# Patient Record
Sex: MALE | Race: WHITE | NOT HISPANIC OR LATINO
[De-identification: names, ages, dates, MRNs, and addresses within clinical notes are randomized per-mention and may not be internally consistent; named-entity substitution may affect disease eponyms.]

---

## 2019-02-08 RX ORDER — TRAZODONE HCL 50 MG
0 TABLET ORAL
Qty: 0 | Refills: 0 | COMMUNITY

## 2019-02-08 NOTE — H&P ADULT - NSHPLABSRESULTS_GEN_ALL_CORE
Preop cbc, bmp, pt/inr, ptt, ua wnl;   T and Screen to be done on DOS  preop cxr wnl per clearance  preop ekg wnl per clearance Preop cbc, bmp, pt/inr, ptt, ua wnl;   preop cxr wnl per clearance  preop ekg wnl per clearance

## 2019-02-08 NOTE — H&P ADULT - PROBLEM SELECTOR PLAN 1
Admit to Orthopaedic Service.  Presents today for elective   Pt medically stable and cleared for procedure today by  Admit to Orthopaedic Service.  Presents today for elective   Pt medically stable and cleared for procedure today by Dr. Li

## 2019-02-08 NOTE — H&P ADULT - NSHPPHYSICALEXAM_GEN_ALL_CORE
PE: Normal head, atraumatic. Normal skin, no rashes/lesions/erythema.        Rest of PE per medical clearance. PE: Normal head, atraumatic. Normal skin, no rashes/lesions/erythema.  MSK: AIN/PIN/U nerves intact to motor BUE. Weakness right interossi compared to left. Wrist flex/ext intact BUE. Sensation intact to M/R/U/Msk/Ax nerves and equal BUE. Cap refill brisk. Skin warm and well perfused. Radial pulses palpable.   Rest of PE per medical clearance. PE: Normal head, atraumatic. Normal skin, no rashes/lesions/erythema.  MSK: AIN/PIN/U nerves intact to motor BUE. Weakness right interossi compared to left. Wrist flex/ext intact BUE. Sensation intact to M/R/U/Msk/Ax nerves and diminished to right medial and radial n distribution compared to left. Cap refill brisk. Skin warm and well perfused. Radial pulses palpable.   Rest of PE per medical clearance.
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
## 2019-02-11 ENCOUNTER — RESULT REVIEW (OUTPATIENT)
Age: 44
End: 2019-02-11

## 2019-02-11 ENCOUNTER — INPATIENT (INPATIENT)
Facility: HOSPITAL | Age: 44
LOS: 0 days | Discharge: HOME | DRG: 472 | End: 2019-02-12
Attending: ORTHOPAEDIC SURGERY | Admitting: ORTHOPAEDIC SURGERY
Payer: COMMERCIAL

## 2019-02-11 VITALS
DIASTOLIC BLOOD PRESSURE: 85 MMHG | WEIGHT: 182.1 LBS | OXYGEN SATURATION: 98 % | HEIGHT: 67 IN | TEMPERATURE: 97 F | SYSTOLIC BLOOD PRESSURE: 122 MMHG | HEART RATE: 60 BPM | RESPIRATION RATE: 16 BRPM

## 2019-02-11 DIAGNOSIS — Z98.890 OTHER SPECIFIED POSTPROCEDURAL STATES: Chronic | ICD-10-CM

## 2019-02-11 DIAGNOSIS — M50.20 OTHER CERVICAL DISC DISPLACEMENT, UNSPECIFIED CERVICAL REGION: ICD-10-CM

## 2019-02-11 DIAGNOSIS — C73 MALIGNANT NEOPLASM OF THYROID GLAND: ICD-10-CM

## 2019-02-11 LAB — GLUCOSE BLDC GLUCOMTR-MCNC: 154 MG/DL — HIGH (ref 70–99)

## 2019-02-11 RX ORDER — OXYCODONE HYDROCHLORIDE 5 MG/1
5 TABLET ORAL EVERY 4 HOURS
Qty: 0 | Refills: 0 | Status: DISCONTINUED | OUTPATIENT
Start: 2019-02-11 | End: 2019-02-11

## 2019-02-11 RX ORDER — DOCUSATE SODIUM 100 MG
100 CAPSULE ORAL THREE TIMES A DAY
Qty: 0 | Refills: 0 | Status: DISCONTINUED | OUTPATIENT
Start: 2019-02-11 | End: 2019-02-12

## 2019-02-11 RX ORDER — LEVOTHYROXINE SODIUM 125 MCG
137 TABLET ORAL DAILY
Qty: 0 | Refills: 0 | Status: DISCONTINUED | OUTPATIENT
Start: 2019-02-11 | End: 2019-02-12

## 2019-02-11 RX ORDER — LEVOTHYROXINE SODIUM 125 MCG
1 TABLET ORAL
Qty: 0 | Refills: 0 | COMMUNITY

## 2019-02-11 RX ORDER — ONDANSETRON 8 MG/1
4 TABLET, FILM COATED ORAL THREE TIMES A DAY
Qty: 0 | Refills: 0 | Status: DISCONTINUED | OUTPATIENT
Start: 2019-02-11 | End: 2019-02-12

## 2019-02-11 RX ORDER — HYDROMORPHONE HYDROCHLORIDE 2 MG/ML
0.5 INJECTION INTRAMUSCULAR; INTRAVENOUS; SUBCUTANEOUS
Qty: 0 | Refills: 0 | Status: DISCONTINUED | OUTPATIENT
Start: 2019-02-11 | End: 2019-02-12

## 2019-02-11 RX ORDER — TRAZODONE HCL 50 MG
0.5 TABLET ORAL
Qty: 0 | Refills: 0 | COMMUNITY

## 2019-02-11 RX ORDER — ACETAMINOPHEN 500 MG
650 TABLET ORAL EVERY 6 HOURS
Qty: 0 | Refills: 0 | Status: DISCONTINUED | OUTPATIENT
Start: 2019-02-11 | End: 2019-02-11

## 2019-02-11 RX ORDER — ACETAMINOPHEN 500 MG
975 TABLET ORAL EVERY 8 HOURS
Qty: 0 | Refills: 0 | Status: DISCONTINUED | OUTPATIENT
Start: 2019-02-11 | End: 2019-02-12

## 2019-02-11 RX ORDER — SODIUM CHLORIDE 9 MG/ML
1000 INJECTION, SOLUTION INTRAVENOUS
Qty: 0 | Refills: 0 | Status: DISCONTINUED | OUTPATIENT
Start: 2019-02-11 | End: 2019-02-12

## 2019-02-11 RX ORDER — CYCLOBENZAPRINE HYDROCHLORIDE 10 MG/1
5 TABLET, FILM COATED ORAL THREE TIMES A DAY
Qty: 0 | Refills: 0 | Status: DISCONTINUED | OUTPATIENT
Start: 2019-02-11 | End: 2019-02-12

## 2019-02-11 RX ORDER — SCOPALAMINE 1 MG/3D
1 PATCH, EXTENDED RELEASE TRANSDERMAL ONCE
Qty: 0 | Refills: 0 | Status: COMPLETED | OUTPATIENT
Start: 2019-02-11 | End: 2019-02-12

## 2019-02-11 RX ORDER — SENNA PLUS 8.6 MG/1
2 TABLET ORAL AT BEDTIME
Qty: 0 | Refills: 0 | Status: DISCONTINUED | OUTPATIENT
Start: 2019-02-11 | End: 2019-02-12

## 2019-02-11 RX ORDER — OXYCODONE HYDROCHLORIDE 5 MG/1
10 TABLET ORAL EVERY 4 HOURS
Qty: 0 | Refills: 0 | Status: DISCONTINUED | OUTPATIENT
Start: 2019-02-11 | End: 2019-02-12

## 2019-02-11 RX ORDER — ATORVASTATIN CALCIUM 80 MG/1
40 TABLET, FILM COATED ORAL AT BEDTIME
Qty: 0 | Refills: 0 | Status: DISCONTINUED | OUTPATIENT
Start: 2019-02-11 | End: 2019-02-12

## 2019-02-11 RX ORDER — OXYCODONE HYDROCHLORIDE 5 MG/1
10 TABLET ORAL EVERY 4 HOURS
Qty: 0 | Refills: 0 | Status: DISCONTINUED | OUTPATIENT
Start: 2019-02-11 | End: 2019-02-11

## 2019-02-11 RX ORDER — HYDROMORPHONE HYDROCHLORIDE 2 MG/ML
0.5 INJECTION INTRAMUSCULAR; INTRAVENOUS; SUBCUTANEOUS EVERY 4 HOURS
Qty: 0 | Refills: 0 | Status: DISCONTINUED | OUTPATIENT
Start: 2019-02-11 | End: 2019-02-12

## 2019-02-11 RX ORDER — ROSUVASTATIN CALCIUM 5 MG/1
1 TABLET ORAL
Qty: 0 | Refills: 0 | COMMUNITY

## 2019-02-11 RX ORDER — TRAZODONE HCL 50 MG
50 TABLET ORAL DAILY
Qty: 0 | Refills: 0 | Status: DISCONTINUED | OUTPATIENT
Start: 2019-02-11 | End: 2019-02-12

## 2019-02-11 RX ORDER — OXYCODONE HYDROCHLORIDE 5 MG/1
5 TABLET ORAL EVERY 4 HOURS
Qty: 0 | Refills: 0 | Status: DISCONTINUED | OUTPATIENT
Start: 2019-02-11 | End: 2019-02-12

## 2019-02-11 RX ORDER — CEFAZOLIN SODIUM 1 G
2000 VIAL (EA) INJECTION EVERY 8 HOURS
Qty: 0 | Refills: 0 | Status: COMPLETED | OUTPATIENT
Start: 2019-02-11 | End: 2019-02-12

## 2019-02-11 RX ORDER — CEFAZOLIN SODIUM 1 G
2000 VIAL (EA) INJECTION EVERY 8 HOURS
Qty: 0 | Refills: 0 | Status: DISCONTINUED | OUTPATIENT
Start: 2019-02-11 | End: 2019-02-11

## 2019-02-11 RX ADMIN — SODIUM CHLORIDE 100 MILLILITER(S): 9 INJECTION, SOLUTION INTRAVENOUS at 13:40

## 2019-02-11 RX ADMIN — HYDROMORPHONE HYDROCHLORIDE 0.5 MILLIGRAM(S): 2 INJECTION INTRAMUSCULAR; INTRAVENOUS; SUBCUTANEOUS at 15:32

## 2019-02-11 RX ADMIN — Medication 2000 MILLIGRAM(S): at 17:20

## 2019-02-11 RX ADMIN — OXYCODONE HYDROCHLORIDE 10 MILLIGRAM(S): 5 TABLET ORAL at 23:21

## 2019-02-11 RX ADMIN — Medication 975 MILLIGRAM(S): at 19:29

## 2019-02-11 RX ADMIN — SENNA PLUS 2 TABLET(S): 8.6 TABLET ORAL at 22:21

## 2019-02-11 RX ADMIN — HYDROMORPHONE HYDROCHLORIDE 0.5 MILLIGRAM(S): 2 INJECTION INTRAMUSCULAR; INTRAVENOUS; SUBCUTANEOUS at 15:25

## 2019-02-11 RX ADMIN — OXYCODONE HYDROCHLORIDE 10 MILLIGRAM(S): 5 TABLET ORAL at 22:21

## 2019-02-11 RX ADMIN — ATORVASTATIN CALCIUM 40 MILLIGRAM(S): 80 TABLET, FILM COATED ORAL at 22:21

## 2019-02-11 RX ADMIN — Medication 100 MILLIGRAM(S): at 22:21

## 2019-02-11 RX ADMIN — HYDROMORPHONE HYDROCHLORIDE 0.5 MILLIGRAM(S): 2 INJECTION INTRAMUSCULAR; INTRAVENOUS; SUBCUTANEOUS at 15:11

## 2019-02-11 RX ADMIN — Medication 975 MILLIGRAM(S): at 19:30

## 2019-02-11 NOTE — PROGRESS NOTE ADULT - ASSESSMENT
Spoke with patient  preoperatively and they aware I will be assisting during today's surgery and disclosure performed.

## 2019-02-11 NOTE — PROGRESS NOTE ADULT - SUBJECTIVE AND OBJECTIVE BOX
Orthopaedics Post Op Check    Procedure: ACDF C6-T1  Surgeon: Dr. Thorne     Pt comfortable, without complaints  Denies CP, SOB, N/V, numbness/tingling     Vital Signs Last 24 Hrs  T(C): 36.7 (11 Feb 2019 14:15), Max: 37 (11 Feb 2019 13:25)  T(F): 98.1 (11 Feb 2019 14:15), Max: 98.6 (11 Feb 2019 13:25)  HR: 88 (11 Feb 2019 14:15) (60 - 94)  BP: 152/84 (11 Feb 2019 14:15) (122/85 - 165/76)  BP(mean): 110 (11 Feb 2019 13:45) (97 - 110)  RR: 15 (11 Feb 2019 14:15) (15 - 17)  SpO2: 96% (11 Feb 2019 14:15) (94% - 98%)  AVSS, NAD    Dressing C/D/I; collar in place; HV x 1   General: Pt Alert and oriented       Sensation intact to bilateral UE distally. Motor Strength 5/5 to /interossei/triceps/biceps/deltoid bilaterally. AIN/PIN intact.         Post op XR: Fluoroscopy utilized intra op to confirm level     A/P: 43yMale POD#0 s/p ACDF C6-T1  - Stable  - Pain Control  - DVT ppx: SCDs  - Post op abx: Ancef  - PT, WBS: WBAT  - F/U AM Labs

## 2019-02-11 NOTE — PROGRESS NOTE ADULT - SUBJECTIVE AND OBJECTIVE BOX
Pain Management Consult Note - [facility] [phone]    Chief Complaint: Neck Pain    HPI: Patient seen and examined today, patient s/p ACDF C6-C7, C7-T1, post op day 0. Patient complains of neck and surgical site pain, pain addressed with Oxycodone Po, patient complains of soreness with swalling. . Reviewed pain medication regimen with patient. Dressing c,d,i.         Pain is ___ sharp _x___dull ___burning _x__achy ___ Intensity: ____ mild _x__mod _x__severe     Location __x__surgical site _x___cervical _____lumbar ____abd ____upper ext____lower ext    Worse with _x___activity __x__movement _____physical therapy___ Rest    Improved with _x___medication __x_rest ____physical therapy      ROS: Const:  _-__febrile   Eyes:___ENT:___CV: _-__chest pain  Resp: __-__sob  GI:_-__nausea _-__vomiting ___abd pain ___npo ___clears __full diet __bm  :___ Musk: _x__pain _x__spasm  Skin:___ Neuro:  __-_lvcokfbx__-_bdzpljvna_-__ numbness _-__weakness _-__paresth  Psych:-__anxiety  Endo:___ Heme:___Allergy:_________, _x__all others reviewed and negative      PAST MEDICAL & SURGICAL HISTORY:  Thyroid cancer  History of thyroidectomy  History of lithotripsy  Thyroid cancer  Thyroid cancer  HNP (herniated nucleus pulposus), cervical  History of thyroidectomy  History of lithotripsy    SH: _-__Tobacco   -___Alcohol                          FH:FAMILY HISTORY:      acetaminophen   Tablet .. 975 milliGRAM(s) Oral every 8 hours  acetaminophen   Tablet .. 650 milliGRAM(s) Oral every 6 hours PRN  atorvastatin 40 milliGRAM(s) Oral at bedtime  ceFAZolin  Injectable. 2000 milliGRAM(s) IV Push every 8 hours  cyclobenzaprine 5 milliGRAM(s) Oral three times a day PRN  docusate sodium 100 milliGRAM(s) Oral three times a day  HYDROmorphone  Injectable 0.5 milliGRAM(s) IV Push every 4 hours PRN  HYDROmorphone  Injectable 0.5 milliGRAM(s) IV Push every 15 minutes PRN  HYDROmorphone  Injectable 0.5 milliGRAM(s) IV Push every 2 hours PRN  lactated ringers. 1000 milliLiter(s) IV Continuous <Continuous>  levothyroxine 137 MICROGram(s) Oral daily  oxyCODONE    IR 5 milliGRAM(s) Oral every 4 hours PRN  oxyCODONE    IR 10 milliGRAM(s) Oral every 4 hours PRN  oxyCODONE    IR 5 milliGRAM(s) Oral every 4 hours PRN  oxyCODONE    IR 10 milliGRAM(s) Oral every 4 hours PRN  senna 2 Tablet(s) Oral at bedtime  traZODone 50 milliGRAM(s) Oral daily      T(C): 36.7 (02-11-19 @ 14:15), Max: 37 (02-11-19 @ 13:25)  HR: 80 (02-11-19 @ 15:15) (60 - 94)  BP: 150/78 (02-11-19 @ 15:15) (122/85 - 165/76)  RR: 19 (02-11-19 @ 15:15) (15 - 19)  SpO2: 97% (02-11-19 @ 15:15) (94% - 98%)  Wt(kg): --    T(C): 36.7 (02-11-19 @ 14:15), Max: 37 (02-11-19 @ 13:25)  HR: 80 (02-11-19 @ 15:15) (60 - 94)  BP: 150/78 (02-11-19 @ 15:15) (122/85 - 165/76)  RR: 19 (02-11-19 @ 15:15) (15 - 19)  SpO2: 97% (02-11-19 @ 15:15) (94% - 98%)  Wt(kg): --    T(C): 36.7 (02-11-19 @ 14:15), Max: 37 (02-11-19 @ 13:25)  HR: 80 (02-11-19 @ 15:15) (60 - 94)  BP: 150/78 (02-11-19 @ 15:15) (122/85 - 165/76)  RR: 19 (02-11-19 @ 15:15) (15 - 19)  SpO2: 97% (02-11-19 @ 15:15) (94% - 98%)  Wt(kg): --      PHYSICAL EXAM:  Gen Appearance: _x__no acute distress _x__appropriate        Neuro: _x__SILT feet____ EOM Intact Psych: AAOX_3_, _x__mood/affect appropriate        Eyes: _x__conjunctiva WNL  _x____ Pupils equal and round        ENT: _x__ears and nose atraumatic__x_ Hearing grossly intact        Neck: x___trachea midline, no visible masses ___thyroid without palpable mass    Resp: _x__Nml WOB____No tactile fremitus ___clear to auscultation    Cardio: _x__extremities free from edema __x__pedal pulses palpable    GI/Abdomen: _x__soft __x___ Nontender____x__Nondistended_____HSM    Lymphatic: __x_no palpable nodes in neck  x___no palpable nodes calves and feet    Skin/Wound: ___Incision, _x__Dressing c/d/i,   ____surrounding tissues soft,  _x__drain/chest tube present____    Muscular: EHL __5_/5  Gastrocnemius5___/5    ___absent clubbing/cyanosis        ASSESSMENT: This is a 43y old Male with a history of cervical hnp, s/p ACDF C6-C7, C7-T1, post op day 0.       Recommended Treatment PLAN:  1. Oxycodone 5-10mg Po Q4h prn moderate to severe pain  2. Dilaudid 0.5mg Q2h IVP prn breakthrough pain   3. Tylenol 975mg PO Q8h standing  4. Flexeril 5mg Po Q8h prn muscle spasms  PLan discussed with Dr. Glenn Ocampo Pain Management Consult Note - [facility] [phone]    Chief Complaint: Neck Pain    HPI: Patient seen and examined today, patient s/p ACDF C6-C7, C7-T1, post op day 0. Patient complains of neck and surgical site pain, pain addressed with Oxycodone Po, patient complains of soreness with swallowing. . Reviewed pain medication regimen with patient. Dressing c,d,i.         Pain is ___ sharp _x___dull ___burning _x__achy ___ Intensity: ____ mild _x__mod _x__severe     Location __x__surgical site _x___cervical _____lumbar ____abd ____upper ext____lower ext    Worse with _x___activity __x__movement _____physical therapy___ Rest    Improved with _x___medication __x_rest ____physical therapy      ROS: Const:  _-__febrile   Eyes:___ENT:___CV: _-__chest pain  Resp: __-__sob  GI:_-__nausea _-__vomiting ___abd pain ___npo ___clears __full diet __bm  :___ Musk: _x__pain _x__spasm  Skin:___ Neuro:  __-_tvlawfjy__-_axgliagnc_-__ numbness _-__weakness _-__paresth  Psych:-__anxiety  Endo:___ Heme:___Allergy:_________, _x__all others reviewed and negative      PAST MEDICAL & SURGICAL HISTORY:  Thyroid cancer  History of thyroidectomy  History of lithotripsy  Thyroid cancer  Thyroid cancer  HNP (herniated nucleus pulposus), cervical  History of thyroidectomy  History of lithotripsy    SH: _-__Tobacco   -___Alcohol                          FH:FAMILY HISTORY:      acetaminophen   Tablet .. 975 milliGRAM(s) Oral every 8 hours  acetaminophen   Tablet .. 650 milliGRAM(s) Oral every 6 hours PRN  atorvastatin 40 milliGRAM(s) Oral at bedtime  ceFAZolin  Injectable. 2000 milliGRAM(s) IV Push every 8 hours  cyclobenzaprine 5 milliGRAM(s) Oral three times a day PRN  docusate sodium 100 milliGRAM(s) Oral three times a day  HYDROmorphone  Injectable 0.5 milliGRAM(s) IV Push every 4 hours PRN  HYDROmorphone  Injectable 0.5 milliGRAM(s) IV Push every 15 minutes PRN  HYDROmorphone  Injectable 0.5 milliGRAM(s) IV Push every 2 hours PRN  lactated ringers. 1000 milliLiter(s) IV Continuous <Continuous>  levothyroxine 137 MICROGram(s) Oral daily  oxyCODONE    IR 5 milliGRAM(s) Oral every 4 hours PRN  oxyCODONE    IR 10 milliGRAM(s) Oral every 4 hours PRN  oxyCODONE    IR 5 milliGRAM(s) Oral every 4 hours PRN  oxyCODONE    IR 10 milliGRAM(s) Oral every 4 hours PRN  senna 2 Tablet(s) Oral at bedtime  traZODone 50 milliGRAM(s) Oral daily      T(C): 36.7 (02-11-19 @ 14:15), Max: 37 (02-11-19 @ 13:25)  HR: 80 (02-11-19 @ 15:15) (60 - 94)  BP: 150/78 (02-11-19 @ 15:15) (122/85 - 165/76)  RR: 19 (02-11-19 @ 15:15) (15 - 19)  SpO2: 97% (02-11-19 @ 15:15) (94% - 98%)  Wt(kg): --    T(C): 36.7 (02-11-19 @ 14:15), Max: 37 (02-11-19 @ 13:25)  HR: 80 (02-11-19 @ 15:15) (60 - 94)  BP: 150/78 (02-11-19 @ 15:15) (122/85 - 165/76)  RR: 19 (02-11-19 @ 15:15) (15 - 19)  SpO2: 97% (02-11-19 @ 15:15) (94% - 98%)  Wt(kg): --    T(C): 36.7 (02-11-19 @ 14:15), Max: 37 (02-11-19 @ 13:25)  HR: 80 (02-11-19 @ 15:15) (60 - 94)  BP: 150/78 (02-11-19 @ 15:15) (122/85 - 165/76)  RR: 19 (02-11-19 @ 15:15) (15 - 19)  SpO2: 97% (02-11-19 @ 15:15) (94% - 98%)  Wt(kg): --      PHYSICAL EXAM:  Gen Appearance: _x__no acute distress _x__appropriate        Neuro: _x__SILT feet____ EOM Intact Psych: AAOX_3_, _x__mood/affect appropriate        Eyes: _x__conjunctiva WNL  _x____ Pupils equal and round        ENT: _x__ears and nose atraumatic__x_ Hearing grossly intact        Neck: x___trachea midline, no visible masses ___thyroid without palpable mass    Resp: _x__Nml WOB____No tactile fremitus ___clear to auscultation    Cardio: _x__extremities free from edema __x__pedal pulses palpable    GI/Abdomen: _x__soft __x___ Nontender____x__Nondistended_____HSM    Lymphatic: __x_no palpable nodes in neck  x___no palpable nodes calves and feet    Skin/Wound: ___Incision, _x__Dressing c/d/i,   ____surrounding tissues soft,  _x__drain/chest tube present____    Muscular: EHL __5_/5  Gastrocnemius5___/5    ___absent clubbing/cyanosis        ASSESSMENT: This is a 43y old Male with a history of cervical hnp, s/p ACDF C6-C7, C7-T1, post op day 0.       Recommended Treatment PLAN:  1. Oxycodone 5-10mg Po Q4h prn moderate to severe pain  2. Dilaudid 0.5mg Q2h IVP prn breakthrough pain   3. Tylenol 975mg PO Q8h standing  4. Flexeril 5mg Po Q8h prn muscle spasms  PLan discussed with Dr. Glenn Ocampo

## 2019-02-11 NOTE — PROGRESS NOTE ADULT - SUBJECTIVE AND OBJECTIVE BOX
Patient seen, examined in preop area with wife, and family members  at chairside. He continues to describe severe, unremitting pain radiating into the right upper extremity. The only positiion of partial relief of symptoms is with the arm resting over the head (shoulder abducted).  Exam is changed with regard to right upper extremity weakness: now the elbow extension is 2/5, with wrist flexion 2/5 and finger extension 2/5 and interossei 2/5.    reflexes and sensory examination is unchanged.    The condition and treatment options were discussed with the patient and his family.  The risks, benefits and alternatives to anterior cervical discectomy at C6-7 and C7-T1 with anterior plate, screws and donated bone graft was discussed. The primary goal of surgery  is to relieve radicular pain and prevent further neurological deterioration.  Full neurological recovery cannot be guaranteed.   The complications of surgery were discussed and include, but are not limited to, wound problems, infection, bleeding, vascular injury, nerve injury, paralysis, cerebrospinal fluid leak, persistent symptoms and/or weakness, hardware failure, loss of fixation, non-union, pseudarthrosis, junctional/adjacent level disease, difficulty swallowing, hoarse voice, vocal cord paralysis, esophageal injury, deep vein thrombosis, pulmonary embolus, myocardial infarction, stroke, death and need for additional surgery.    All questions were answered and informed consent was obtained.  Notes and/or documentation by others in this electronic record are signed but not reviewed for accuracy.  NLT

## 2019-02-12 ENCOUNTER — TRANSCRIPTION ENCOUNTER (OUTPATIENT)
Age: 44
End: 2019-02-12

## 2019-02-12 VITALS
HEART RATE: 80 BPM | SYSTOLIC BLOOD PRESSURE: 122 MMHG | DIASTOLIC BLOOD PRESSURE: 66 MMHG | TEMPERATURE: 99 F | RESPIRATION RATE: 18 BRPM | OXYGEN SATURATION: 96 %

## 2019-02-12 LAB
ANION GAP SERPL CALC-SCNC: 9 MMOL/L — SIGNIFICANT CHANGE UP (ref 5–17)
BASOPHILS # BLD AUTO: 0 K/UL — SIGNIFICANT CHANGE UP (ref 0–0.2)
BASOPHILS NFR BLD AUTO: 0 % — SIGNIFICANT CHANGE UP (ref 0–2)
BUN SERPL-MCNC: 10 MG/DL — SIGNIFICANT CHANGE UP (ref 7–23)
CALCIUM SERPL-MCNC: 9.7 MG/DL — SIGNIFICANT CHANGE UP (ref 8.4–10.5)
CHLORIDE SERPL-SCNC: 104 MMOL/L — SIGNIFICANT CHANGE UP (ref 96–108)
CO2 SERPL-SCNC: 29 MMOL/L — SIGNIFICANT CHANGE UP (ref 22–31)
CREAT SERPL-MCNC: 1.08 MG/DL — SIGNIFICANT CHANGE UP (ref 0.5–1.3)
EOSINOPHIL # BLD AUTO: 0 K/UL — SIGNIFICANT CHANGE UP (ref 0–0.5)
EOSINOPHIL NFR BLD AUTO: 0 % — SIGNIFICANT CHANGE UP (ref 0–6)
GLUCOSE SERPL-MCNC: 134 MG/DL — HIGH (ref 70–99)
HCT VFR BLD CALC: 47.4 % — SIGNIFICANT CHANGE UP (ref 39–50)
HGB BLD-MCNC: 16 G/DL — SIGNIFICANT CHANGE UP (ref 13–17)
LYMPHOCYTES # BLD AUTO: 1.93 K/UL — SIGNIFICANT CHANGE UP (ref 1–3.3)
LYMPHOCYTES # BLD AUTO: 8.7 % — LOW (ref 13–44)
MCHC RBC-ENTMCNC: 30.1 PG — SIGNIFICANT CHANGE UP (ref 27–34)
MCHC RBC-ENTMCNC: 33.8 GM/DL — SIGNIFICANT CHANGE UP (ref 32–36)
MCV RBC AUTO: 89.3 FL — SIGNIFICANT CHANGE UP (ref 80–100)
MONOCYTES # BLD AUTO: 0.58 K/UL — SIGNIFICANT CHANGE UP (ref 0–0.9)
MONOCYTES NFR BLD AUTO: 2.6 % — SIGNIFICANT CHANGE UP (ref 2–14)
NEUTROPHILS # BLD AUTO: 19.28 K/UL — HIGH (ref 1.8–7.4)
NEUTROPHILS NFR BLD AUTO: 85.2 % — HIGH (ref 43–77)
PLATELET # BLD AUTO: 294 K/UL — SIGNIFICANT CHANGE UP (ref 150–400)
POTASSIUM SERPL-MCNC: 4.2 MMOL/L — SIGNIFICANT CHANGE UP (ref 3.5–5.3)
POTASSIUM SERPL-SCNC: 4.2 MMOL/L — SIGNIFICANT CHANGE UP (ref 3.5–5.3)
RBC # BLD: 5.31 M/UL — SIGNIFICANT CHANGE UP (ref 4.2–5.8)
RBC # FLD: 12.5 % — SIGNIFICANT CHANGE UP (ref 10.3–14.5)
SODIUM SERPL-SCNC: 142 MMOL/L — SIGNIFICANT CHANGE UP (ref 135–145)
WBC # BLD: 22.16 K/UL — HIGH (ref 3.8–10.5)
WBC # FLD AUTO: 22.16 K/UL — HIGH (ref 3.8–10.5)

## 2019-02-12 RX ORDER — DIAZEPAM 5 MG
0 TABLET ORAL
Qty: 0 | Refills: 0 | COMMUNITY

## 2019-02-12 RX ORDER — SENNA PLUS 8.6 MG/1
2 TABLET ORAL
Qty: 0 | Refills: 0 | COMMUNITY
Start: 2019-02-12

## 2019-02-12 RX ORDER — BENZOCAINE AND MENTHOL 5; 1 G/100ML; G/100ML
1 LIQUID ORAL
Qty: 0 | Refills: 0 | COMMUNITY
Start: 2019-02-12

## 2019-02-12 RX ORDER — BENZOCAINE AND MENTHOL 5; 1 G/100ML; G/100ML
1 LIQUID ORAL ONCE
Qty: 0 | Refills: 0 | Status: COMPLETED | OUTPATIENT
Start: 2019-02-12 | End: 2019-02-12

## 2019-02-12 RX ORDER — ACETAMINOPHEN 500 MG
2 TABLET ORAL
Qty: 0 | Refills: 0 | COMMUNITY
Start: 2019-02-12

## 2019-02-12 RX ORDER — DOCUSATE SODIUM 100 MG
1 CAPSULE ORAL
Qty: 0 | Refills: 0 | COMMUNITY
Start: 2019-02-12

## 2019-02-12 RX ORDER — DIAZEPAM 5 MG
1 TABLET ORAL
Qty: 0 | Refills: 0 | COMMUNITY

## 2019-02-12 RX ORDER — POLYETHYLENE GLYCOL 3350 17 G/17G
1 POWDER, FOR SOLUTION ORAL
Qty: 0 | Refills: 0 | COMMUNITY

## 2019-02-12 RX ADMIN — BENZOCAINE AND MENTHOL 1 LOZENGE: 5; 1 LIQUID ORAL at 12:06

## 2019-02-12 RX ADMIN — Medication 975 MILLIGRAM(S): at 07:21

## 2019-02-12 RX ADMIN — Medication 100 MILLIGRAM(S): at 07:20

## 2019-02-12 RX ADMIN — SODIUM CHLORIDE 100 MILLILITER(S): 9 INJECTION, SOLUTION INTRAVENOUS at 01:11

## 2019-02-12 RX ADMIN — Medication 137 MICROGRAM(S): at 06:57

## 2019-02-12 RX ADMIN — Medication 975 MILLIGRAM(S): at 08:21

## 2019-02-12 RX ADMIN — Medication 2000 MILLIGRAM(S): at 01:11

## 2019-02-12 RX ADMIN — SCOPALAMINE 1 PATCH: 1 PATCH, EXTENDED RELEASE TRANSDERMAL at 07:18

## 2019-02-12 RX ADMIN — SCOPALAMINE 1 PATCH: 1 PATCH, EXTENDED RELEASE TRANSDERMAL at 01:11

## 2019-02-12 NOTE — DISCHARGE NOTE ADULT - OTHER SIGNIFICANT FINDINGS
***Notes/documentation by others:  Despite the "electronic signature" provided in this electronic chart, the contents of this note and notes by others in this chart have not been reviewed for accuracy.  Hospital policy requires me to provide an electronic signature but it is not customary practice for me to review the notes of others and as a result, I cannot attest to the accuracy of this note.  NLT
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
## 2019-02-12 NOTE — PROGRESS NOTE ADULT - SUBJECTIVE AND OBJECTIVE BOX
patient seen, examined.  Did well overnight; wife at bedside overnight; reports near complete resolution of right radicular symptoms.  Mild finger tip numbness/discomfort residual noted.  Strength signficantanly improved.  No longer needs to hold arm overhead in shoulder abduction. Swallowing without difficulty, has been out of bed, ambulating, voiding without difficulty.   Vocalizing well.  Tolerating fluids/diet well.    PE:   dressing dry and intact; drain in place- min drainage  motor 4/5 (improved) interossei and elbow extension  sensory intact to light touch  deep tendon reflexes unchanged.  no calf tenderness (sequential compression devices are on and in place)    imp:  pod #1   pain management  remove drain later this AM  sequential compression devices while in bed  incentive spirometry (I personally reviewed instructions and technique with patient)  progressive ambulation  avoid lifting, twisting, bending, exposure to cigarette smoke, Non-steroidal anti-inflammatories  discussed vitamin D, calcium (if permitted with kidney stone per primary care), metamucil/colace  cleared to discharge to home later today   encourage hydration  will start OT at home later this week  patient advised of softer bone than expected for his age, noted by me at surgery, and will followup for bone metabolic workup with primary care carefully with history of kidney stones)  call for questions, fevers, chills or any wound drainage  go to nearest emergency room for any new symptoms, difficulty swallowing or breathing  follow-up in 7-10 days

## 2019-02-12 NOTE — DISCHARGE NOTE ADULT - MEDICATION SUMMARY - MEDICATIONS TO TAKE
I will START or STAY ON the medications listed below when I get home from the hospital:    acetaminophen 325 mg oral tablet  -- 2 tab(s) by mouth every 6 hours, As Needed for mild pain (1-3) or fever (temperature greater than 100.4F), max 3000mg daily  -- Indication: For Mild pain or fever - please purchase over the counter    Percocet 5/325 oral tablet  -- 1 tab po q6h prn moderate pain (4-6) or 2 tabs po q6h prn severe pain (7-10), wean off as kimberlee,  MDD:5 tabs. Max APAP 3000mg daily.  -- Indication: For Moderate to severe pain    Valium 5 mg oral tablet  -- 1  by mouth once a day (at bedtime), As Needed  -- Indication: For Muscle tightness/spasm    traZODone 100 mg oral tablet  -- 0.5  orally  -- Indication: For Insomnia    Crestor 10 mg oral tablet  -- 1 tab(s) by mouth once a day (at bedtime)  -- Indication: For HL    docusate sodium 100 mg oral capsule  -- 1 cap(s) by mouth 3 times a day, As Needed for constipation  -- Indication: For Constipation - please purchase over the counter    senna oral tablet  -- 2 tab(s) by mouth once a day (at bedtime), As Needed for constipation  -- Indication: For Constipation - please purchase over the counter    MiraLax oral powder for reconstitution  -- 1 packet(s) by mouth once a day, As Needed for constipation  -- Indication: For Constipation - please purchase over the counter    Cepacol Anesthetic 10 mg-0.07% oral lozenge  -- 1 lozenge by mouth every 4 hours, As Needed for sore throat  -- Indication: For Sore throat -  please purchase over the counter    Levothroid 137 mcg (0.137 mg) oral tablet  -- 1 tab(s) by mouth once a day  -- Indication: For Thyroid hormone I will START or STAY ON the medications listed below when I get home from the hospital:    acetaminophen 325 mg oral tablet  -- 2 tab(s) by mouth every 6 hours, As Needed for mild pain (1-3) or fever (temperature greater than 100.4F), max 3000mg daily  -- Indication: For Mild pain or fever - please purchase over the counter    Percocet 5/325 oral tablet  -- 1 tab po q6h prn moderate pain (4-6) or 2 tabs po q6h prn severe pain (7-10), wean off as kimberlee,  MDD:5 tabs. Max APAP 3000mg daily.  -- Indication: For Moderate to severe pain    Valium 5 mg oral tablet  -- 1  by mouth once a day (at bedtime), As Needed  -- Indication: For Muscle tightness/spasm    traZODone 100 mg oral tablet  -- 0.5  orally  -- Indication: For Insomnia    Crestor 10 mg oral tablet  -- 1 tab(s) by mouth once a day (at bedtime)  -- Indication: For HL    docusate sodium 100 mg oral capsule  -- 1 cap(s) by mouth 3 times a day, As Needed for constipation  -- Indication: For Constipation - please purchase over the counter    senna oral tablet  -- 2 tab(s) by mouth once a day (at bedtime), As Needed for constipation  -- Indication: For Constipation - please purchase over the counter    MiraLax oral powder for reconstitution  -- 1 packet(s) by mouth once a day, As Needed for constipation  -- Indication: For Constipation - please purchase over the counter    benzocaine-menthol 15 mg-3.6 mg mucous membrane lozenge  -- 1 lozenge mucous membrane every 4 hours, As Needed for sore throat  -- Indication: For Sore throat    Levothroid 137 mcg (0.137 mg) oral tablet  -- 1 tab(s) by mouth once a day  -- Indication: For Thyroid hormone

## 2019-02-12 NOTE — OCCUPATIONAL THERAPY INITIAL EVALUATION ADULT - GENERAL OBSERVATIONS, REHAB EVAL
Right hand dominant. Patient cleared for Occupational Therapy by RODRIGUEZ Marquis. Patient received in NAD, +IV heplock, + RN Kandis presents, +wife, +wound anterior cervical spine c/d/i.

## 2019-02-12 NOTE — OCCUPATIONAL THERAPY INITIAL EVALUATION ADULT - GROSSLY INTACT, SENSORY
Grossly Intact/Left UE/Right LE/Right UE/Left LE/patient states that sensation/weakness in right UE resolved post-surgery

## 2019-02-12 NOTE — PROGRESS NOTE ADULT - PROVIDER SPECIALTY LIST ADULT
Neurosurgery
Orthopedics
Pain Medicine
Pain Medicine
I will START or STAY ON the medications listed below when I get home from the hospital:    spironolactone 25 mg oral tablet  -- 1 tab(s) by mouth once a day  -- Indication: For Cardiac med    Percocet 5/325 oral tablet  -- 1 tab(s) by mouth every 6 hours, As Needed -for mild pain  May take 2 Tabs every 6 hours for severe pain MDD:6  -- Caution federal law prohibits the transfer of this drug to any person other  than the person for whom it was prescribed.  May cause drowsiness.  Alcohol may intensify this effect.  Use care when operating dangerous machinery.  This prescription cannot be refilled.  This product contains acetaminophen.  Do not use  with any other product containing acetaminophen to prevent possible liver damage.  Using more of this medication than prescribed may cause serious breathing problems.    -- Indication: For pain    Tylenol 325 mg oral tablet  -- 2 tab(s) by mouth every 6 hours, As Needed for mild pain  -- This product contains acetaminophen.  Do not use  with any other product containing acetaminophen to prevent possible liver damage.    -- Indication: For pain    aspirin 81 mg oral delayed release tablet  -- 1 tab(s) by mouth once a day  -- Indication: For Cardiac med    colchicine 0.6 mg oral tablet  -- 1 tab(s) by mouth 2 times a day  -- Indication: For Cardiac med    atorvastatin 40 mg oral tablet  -- 1 tab(s) by mouth once a day (at bedtime)  -- Indication: For Anticholesterol    metoprolol tartrate 50 mg oral tablet  -- 1 tab(s) by mouth 2 times a day  -- Indication: For Cardiac med    furosemide 40 mg oral tablet  -- 1 tab(s) by mouth once a day  -- Indication: For Cardiac med    famotidine 20 mg oral tablet  -- 1 tab(s) by mouth 2 times a day  -- Indication: For GERD    docusate sodium 100 mg oral capsule  -- 1 cap(s) by mouth 3 times a day  -- Indication: For Stool softener    polyethylene glycol 3350 oral powder for reconstitution  -- 17 gram(s) by mouth once a day, As Needed -for constipation   -- Indication: For laxative    glucosamine hydrochloride 1500 mg oral tablet  -- 1 tab(s) by mouth once a day  -- Indication: For Supplement    Vitamin D3 5000 intl units oral capsule  -- 1 cap(s) by mouth once a day  -- Indication: For Supplement/vitamin

## 2019-02-12 NOTE — DISCHARGE NOTE ADULT - CARE PROVIDER_API CALL
Kehinde Thorne)  Orthopaedic Surgery  [address], [address] [place] [address]  Phone: [phone]  Fax: [phone]  Follow Up Time:

## 2019-02-12 NOTE — DISCHARGE NOTE ADULT - PATIENT PORTAL LINK FT
You can access the [facility], offered by [facility], by registering with the following website: [email or web address]

## 2019-02-12 NOTE — PROGRESS NOTE ADULT - SUBJECTIVE AND OBJECTIVE BOX
Pain Management Progress Note - [facility] [phone]      HPI: Patient seen and examined today, patient s/p ACDF C6-C7, C7-T1, post op day 1. Patient complains of neck and surgical site pain, pain addressed with Oxycodone Po. Patient AxOx3, denies n,v,, no s/s of oversedation.      Pain is ___ sharp _x___dull ___burning _x__achy ___ Intensity: ____ mild _x__mod _x__severe     Location __x__surgical site _x___cervical _____lumbar ____abd ____upper ext____lower ext    Worse with _x___activity __x__movement _____physical therapy___ Rest    Improved with _x___medication __x_rest ____physical therapy      levothyroxine  atorvastatin  traZODone  lactated ringers.  acetaminophen   Tablet ..  ceFAZolin   IVPB  docusate sodium  senna  oxyCODONE    IR  HYDROmorphone  Injectable  ceFAZolin  Injectable.  oxyCODONE    IR  HYDROmorphone  Injectable  acetaminophen   Tablet ..  cyclobenzaprine  ondansetron    Tablet  scopolamine   Patch  benzocaine 15 mG/menthol 3.6 mG Lozenge      ROS: Const:  _-__febrile   Eyes:___ENT:___CV: _-__chest pain  Resp: __-__sob  GI:_-__nausea _-__vomiting ___abd pain ___npo ___clears _x_full diet __bm  :___ Musk: _x__pain _x__spasm  Skin:___ Neuro:  __-_htklxyvu__-_wlovprkpb_-__ numbness _-__weakness _-__paresth  Psych:-__anxiety  Endo:___ Heme:___Allergy:_________, _x__all others reviewed and negative      PAST MEDICAL & SURGICAL HISTORY:  Thyroid cancer  History of thyroidectomy  History of lithotripsy  Thyroid cancer  Thyroid cancer  HNP (herniated nucleus pulposus), cervical  History of thyroidectomy  History of lithotripsy    02-12 @ 06:4684 mL/min/1.73M2      Hemoglobin: 16.0 g/dL (02-12 @ 06:46)      T(C): 37.3 (02-12-19 @ 09:11), Max: 37.4 (02-12-19 @ 04:46)  HR: 80 (02-12-19 @ 09:11) (77 - 94)  BP: 122/66 (02-12-19 @ 09:11) (119/68 - 165/76)  RR: 18 (02-12-19 @ 09:11) (15 - 19)  SpO2: 96% (02-12-19 @ 09:11) (94% - 97%)  Wt(kg): --      PHYSICAL EXAM:  Gen Appearance: _x__no acute distress _x__appropriate        Neuro: _x__SILT feet____ EOM Intact Psych: AAOX_3_, _x__mood/affect appropriate        Eyes: _x__conjunctiva WNL  _x____ Pupils equal and round        ENT: _x__ears and nose atraumatic__x_ Hearing grossly intact        Neck: x___trachea midline, no visible masses ___thyroid without palpable mass    Resp: _x__Nml WOB____No tactile fremitus ___clear to auscultation    Cardio: _x__extremities free from edema __x__pedal pulses palpable    GI/Abdomen: _x__soft __x___ Nontender____x__Nondistended_____HSM    Lymphatic: __x_no palpable nodes in neck  x___no palpable nodes calves and feet    Skin/Wound: ___Incision, _x__Dressing c/d/i,   ____surrounding tissues soft,  _x__drain/chest tube present____    Muscular: EHL __5_/5  Gastrocnemius5___/5    ___absent clubbing/cyanosis        ASSESSMENT: This is a 43y old Male with a history of cervical hnp, s/p ACDF C6-C7, C7-T1, post op day 1, pain controlled with current pain medication regimen.       Recommended Treatment PLAN:  1. Oxycodone 5-10mg Po Q4h prn moderate to severe pain  2. Dilaudid 0.5mg Q2h IVP prn breakthrough pain   3. Tylenol 975mg PO Q8h standing  4. Flexeril 5mg Po Q8h prn muscle spasms  PLan discussed with Dr. Glenn Ocampo

## 2019-02-12 NOTE — PROGRESS NOTE ADULT - SUBJECTIVE AND OBJECTIVE BOX
Ortho Note    Pt comfortable without complaints, pain controlled  Denies CP, SOB, N/V, numbness/tingling     Vital Signs Last 24 Hrs  T(C): 37.3 (02-12-19 @ 09:11), Max: 37.4 (02-12-19 @ 04:46)  T(F): 99.1 (02-12-19 @ 09:11), Max: 99.3 (02-12-19 @ 04:46)  HR: 80 (02-12-19 @ 09:11) (77 - 80)  BP: 122/66 (02-12-19 @ 09:11) (119/68 - 122/66)  BP(mean): --  RR: 18 (02-12-19 @ 09:11) (16 - 18)  SpO2: 96% (02-12-19 @ 09:11) (96% - 97%)    General: Pt Alert and oriented, NAD  Neck DSG C/D/I  HVx1 in place holding good suction   Pulses: 2+ radial   Sensation: C5-T1 intact  Motor: +D/T/B/WE/WF/IO                          16.0   22.16 )-----------( 294      ( 12 Feb 2019 06:46 )             47.4   12 Feb 2019 06:46    142    |  104    |  10     ----------------------------<  134    4.2     |  29     |  1.08     Ca    9.7        12 Feb 2019 06:46    Drain output:    02-11-19 @ 07:01  -  02-12-19 @ 07:00  --------------------------------------------------------  OUT:    Accordian: 43 mL  Total OUT: 43 mL    A/P: 43M s/p ACDF C6-T1  - Stable  - Pain Control  - DVT ppx: SCDs  - PT, WBS: WBAT  - DC drain  - dispo home     Ortho Pager [phone]

## 2019-02-12 NOTE — DISCHARGE NOTE ADULT - CARE PLAN
Principal Discharge DX:	HNP (herniated nucleus pulposus), cervical  Goal:	Improvement  Assessment and plan of treatment:	See below

## 2019-02-12 NOTE — DISCHARGE NOTE ADULT - ADDITIONAL INSTRUCTIONS
**Your medications have been sent to [facility], which is on the first floor of [facility], please  at the time discharge.     No strenuous activity (bending/twisting), heavy lifting, driving or returning to work until cleared by MD.    Change dressing daily with gauze/tape or medipore dressing until post-op day #5, then leave incision open to air. You may shower post-op day#5, keep incision clean and dry.  May apply ice to affected areas to decrease swelling.    Try to have regular bowel movements, take stool softener or laxative if necessary. May take pepcid or zantac for upset stomach.    Call to schedule an appointment with Dr. Thorne for follow up, if you have staples or sutures they will be removed in office.  Contact your doctor if you experience: fever greater than 101.5, chills, chest pain, difficulty breathing, redness or excessive drainage around the incision, other concerns.    Follow up with your Primary Care Provider. **Your prescription for pain medication has been sent to [facility], which is on the first floor of [facility], please  at the time discharge.     No strenuous activity (bending/twisting), heavy lifting, driving or returning to work until cleared by MD.    Change dressing daily with gauze/tape or medipore dressing until post-op day #5, then leave incision open to air. You may shower post-op day#5, keep incision clean and dry.  May apply ice to affected areas to decrease swelling.    Try to have regular bowel movements, take stool softener or laxative if necessary. May take pepcid or zantac for upset stomach.    Call to schedule an appointment with Dr. Thorne for follow up, if you have staples or sutures they will be removed in office.  Contact your doctor if you experience: fever greater than 101.5, chills, chest pain, difficulty breathing, redness or excessive drainage around the incision, other concerns.    Follow up with your Primary Care Provider.

## 2019-02-12 NOTE — DISCHARGE NOTE ADULT - HOSPITAL COURSE
Admitted  Surgery - underwent ACDF C6-T1  Angle-op Antibiotics  Pain control  DVT prophylaxis  OOB/Physical Therapy  Consult: Pain Mgmt, Neuro

## 2019-02-13 LAB — SURGICAL PATHOLOGY STUDY: SIGNIFICANT CHANGE UP

## 2019-02-14 DIAGNOSIS — M50.120 MID-CERVICAL DISC DISORDER, UNSPECIFIED LEVEL: ICD-10-CM

## 2019-02-14 DIAGNOSIS — E78.5 HYPERLIPIDEMIA, UNSPECIFIED: ICD-10-CM

## 2019-02-14 DIAGNOSIS — E03.9 HYPOTHYROIDISM, UNSPECIFIED: ICD-10-CM

## 2019-02-14 DIAGNOSIS — G47.00 INSOMNIA, UNSPECIFIED: ICD-10-CM

## 2019-02-14 DIAGNOSIS — M50.03 CERVICAL DISC DISORDER WITH MYELOPATHY, CERVICOTHORACIC REGION: ICD-10-CM

## 2019-02-14 DIAGNOSIS — M50.13 CERVICAL DISC DISORDER WITH RADICULOPATHY, CERVICOTHORACIC REGION: ICD-10-CM

## 2019-02-14 DIAGNOSIS — M50.23 OTHER CERVICAL DISC DISPLACEMENT, CERVICOTHORACIC REGION: ICD-10-CM

## 2019-02-14 PROCEDURE — 86900 BLOOD TYPING SEROLOGIC ABO: CPT

## 2019-02-14 PROCEDURE — 86901 BLOOD TYPING SEROLOGIC RH(D): CPT

## 2019-02-14 PROCEDURE — 88304 TISSUE EXAM BY PATHOLOGIST: CPT

## 2019-02-14 PROCEDURE — 86850 RBC ANTIBODY SCREEN: CPT

## 2019-02-14 PROCEDURE — 97161 PT EVAL LOW COMPLEX 20 MIN: CPT

## 2019-02-14 PROCEDURE — C1713: CPT

## 2019-02-14 PROCEDURE — 82962 GLUCOSE BLOOD TEST: CPT

## 2019-02-14 PROCEDURE — 95940 IONM IN OPERATNG ROOM 15 MIN: CPT

## 2019-02-14 PROCEDURE — 76000 FLUOROSCOPY <1 HR PHYS/QHP: CPT

## 2019-02-14 PROCEDURE — C1889: CPT

## 2019-02-14 PROCEDURE — 80048 BASIC METABOLIC PNL TOTAL CA: CPT

## 2019-02-14 PROCEDURE — 85025 COMPLETE CBC W/AUTO DIFF WBC: CPT

## 2019-02-14 PROCEDURE — 36415 COLL VENOUS BLD VENIPUNCTURE: CPT

## 2019-03-06 NOTE — PACU DISCHARGE NOTE - AIRWAY PATENCY:
Assumed patient care at 1900  Patient A&O x 4  No complaints of pain or discomfort at this time  VSS  Tele Afib- rate controlled  BENEDICT-no CPAP  Non productive cough-improved since Saturday  Heparin drip running at 22 mL /hour re-draw in AM  Justin in place- S/S of infection Progressing Satisfactory

## 2021-08-20 PROBLEM — C73 MALIGNANT NEOPLASM OF THYROID GLAND: Chronic | Status: ACTIVE | Noted: 2019-02-08

## 2021-08-24 PROBLEM — Z00.00 ENCOUNTER FOR PREVENTIVE HEALTH EXAMINATION: Status: ACTIVE | Noted: 2021-08-24

## 2021-11-04 ENCOUNTER — APPOINTMENT (OUTPATIENT)
Dept: NEUROLOGY | Facility: CLINIC | Age: 46
End: 2021-11-04

## 2022-01-13 ENCOUNTER — APPOINTMENT (OUTPATIENT)
Dept: NEUROLOGY | Facility: CLINIC | Age: 47
End: 2022-01-13